# Patient Record
Sex: MALE | Race: WHITE | Employment: OTHER | URBAN - METROPOLITAN AREA
[De-identification: names, ages, dates, MRNs, and addresses within clinical notes are randomized per-mention and may not be internally consistent; named-entity substitution may affect disease eponyms.]

---

## 2017-05-10 RX ORDER — DEXTROMETHORPHAN/PSEUDOEPHED 2.5-7.5/.8
1.2 DROPS ORAL
Status: CANCELLED | OUTPATIENT
Start: 2017-05-10

## 2017-05-10 RX ORDER — ATROPINE SULFATE 0.1 MG/ML
0.5 INJECTION INTRAVENOUS
Status: CANCELLED | OUTPATIENT
Start: 2017-05-10 | End: 2017-05-10

## 2017-05-10 RX ORDER — EPINEPHRINE 0.1 MG/ML
1 INJECTION INTRACARDIAC; INTRAVENOUS
Status: CANCELLED | OUTPATIENT
Start: 2017-05-10 | End: 2017-05-10

## 2017-05-12 ENCOUNTER — HOSPITAL ENCOUNTER (OUTPATIENT)
Age: 67
Setting detail: OUTPATIENT SURGERY
Discharge: HOME | End: 2017-05-12
Attending: INTERNAL MEDICINE | Admitting: INTERNAL MEDICINE
Payer: COMMERCIAL

## 2017-05-12 VITALS
DIASTOLIC BLOOD PRESSURE: 68 MMHG | OXYGEN SATURATION: 98 % | TEMPERATURE: 97.6 F | BODY MASS INDEX: 44.1 KG/M2 | SYSTOLIC BLOOD PRESSURE: 140 MMHG | RESPIRATION RATE: 14 BRPM | HEIGHT: 71 IN | WEIGHT: 315 LBS | HEART RATE: 52 BPM

## 2017-05-12 LAB — GLUCOSE BLD STRIP.AUTO-MCNC: 155 MG/DL (ref 70–110)

## 2017-05-12 PROCEDURE — 77030013991 HC SNR POLYP ENDOSC BSC -A: Performed by: INTERNAL MEDICINE

## 2017-05-12 PROCEDURE — 99152 MOD SED SAME PHYS/QHP 5/>YRS: CPT

## 2017-05-12 PROCEDURE — 77030020256 HC SOL INJ NACL 0.9%  500ML: Performed by: INTERNAL MEDICINE

## 2017-05-12 PROCEDURE — 74011250636 HC RX REV CODE- 250/636

## 2017-05-12 PROCEDURE — 82962 GLUCOSE BLOOD TEST: CPT

## 2017-05-12 PROCEDURE — 99153 MOD SED SAME PHYS/QHP EA: CPT

## 2017-05-12 PROCEDURE — 74011250636 HC RX REV CODE- 250/636: Performed by: INTERNAL MEDICINE

## 2017-05-12 PROCEDURE — 88305 TISSUE EXAM BY PATHOLOGIST: CPT | Performed by: INTERNAL MEDICINE

## 2017-05-12 PROCEDURE — 76040000007: Performed by: INTERNAL MEDICINE

## 2017-05-12 RX ORDER — FENTANYL CITRATE 50 UG/ML
100 INJECTION, SOLUTION INTRAMUSCULAR; INTRAVENOUS
Status: ACTIVE | OUTPATIENT
Start: 2017-05-12 | End: 2017-05-12

## 2017-05-12 RX ORDER — ISOSORBIDE MONONITRATE 30 MG/1
30 TABLET, EXTENDED RELEASE ORAL DAILY
COMMUNITY

## 2017-05-12 RX ORDER — SODIUM CHLORIDE 9 MG/ML
100 INJECTION, SOLUTION INTRAVENOUS CONTINUOUS
Status: DISPENSED | OUTPATIENT
Start: 2017-05-12 | End: 2017-05-12

## 2017-05-12 RX ORDER — FLUMAZENIL 0.1 MG/ML
0.2 INJECTION INTRAVENOUS
Status: ACTIVE | OUTPATIENT
Start: 2017-05-12 | End: 2017-05-12

## 2017-05-12 RX ORDER — WARFARIN 2.5 MG/1
2.5 TABLET ORAL DAILY
COMMUNITY

## 2017-05-12 RX ORDER — MIDAZOLAM HYDROCHLORIDE 1 MG/ML
.5-5 INJECTION, SOLUTION INTRAMUSCULAR; INTRAVENOUS
Status: ACTIVE | OUTPATIENT
Start: 2017-05-12 | End: 2017-05-12

## 2017-05-12 RX ORDER — NALOXONE HYDROCHLORIDE 0.4 MG/ML
0.4 INJECTION, SOLUTION INTRAMUSCULAR; INTRAVENOUS; SUBCUTANEOUS
Status: ACTIVE | OUTPATIENT
Start: 2017-05-12 | End: 2017-05-12

## 2017-05-12 RX ADMIN — SODIUM CHLORIDE 100 ML/HR: 900 INJECTION, SOLUTION INTRAVENOUS at 08:03

## 2017-05-12 NOTE — PROCEDURES
[facility]  Colonoscopy Procedure Report  _______________________________________________________  Patient: Terry Carey                                         Attending Physician: Sonia Anderson MD    Patient ID: [id number]                                      Referring Physician: Puja Lopez MD    Exam Date: May 12, 2017 _______________________________________________________      Introduction: A  61 y.o. male patient, presents for outpatient Colonoscopy    Indications: Screen colon cancer. He had on already 2 colonoscopies by Dr Gumaro Stewart, the first colonoscopy 3 polyps and second  December 2, 2013 Six polyps were removed one was hyperplastic. He is asymptomatic. Consent: The benefits, risks, and alternatives to the procedure were discussed and informed consent was obtained from the patient. Preparation: EKG, pulse, pulse oximetry and blood pressure were monitored throughout the procedure. ASA Classification: Class 2 - . BMI 51 The heart is an S1-S2 and regular heart rate and rhythm. Lungs are clear to auscultation and percussion. Abdomen is soft, nondistended, and nontender. Mental Status: awake, alert, and oriented to person, place, and time    Medications:  · Fentanyl 100 mcg IV before procedure. · Versed 5 mg IV throughout the procedure. · Glucagon . 5 mg during    Rectal Exam: Normal Rectal Exam. No Blood. Prostate not enlarged. Pathology Specimens: 3 specimens removed. Procedure: The colonoscope was passed with ease through the anus under direct visualization and advanced to the cecum. The patient required positioning on the back to aid in the passage of the scope. The scope was withdrawn and the mucosa was carefully examined. The quality of the preparation was excellent. The views were excellent. The patient's toleration of the procedure was excellent. Retroflexion was preformed in the rectum, ascending colon and hepatic flexure.  The exam was done twice to the cecum. Total time is 27 minutes and withdrawal time is 21 minutes. Findings:    Rectum:   Medium sized internal hemorrhoids  Sigmoid:   tiny single diverticulum found in the sigmoid  Descending Colon:   5 mm small sessile polyp hot snared,   Transverse Colon:   5 mm small sessile polyp hot snared in the distal transverse colon   Ascending Colon:   6 mm small sessile polyp hot snared in the mid ascending colon,  Cecum:   Normal  Terminal Ileum:   Not seen    Unplanned Events: There were no unplanned events. Estimated Blood Loss: None  Impressions:    Medium sized internal hemorrhoids, 3 small sessile polyps were removed with hot snare, one 6 mm in the ascending colon, one 5 mm in the transverse colon and one 5 mm in the descending colon. Normal Mucosa. One tiny diverticulum found in the sigmoid. Complications: None; patient tolerated the procedure well. Recommendations:  · Discharge home when standard parameters are met. · Resume a high fiber diet. · Colonoscopy recommendation in 5 years.     Procedure Codes:    · Elysia Gonzales [[id number]]    Endoscope Information:  Model Number(s)    [id number]       Assistant: None      Signed By: Milka Davila MD Date: May 12, 2017

## 2017-05-12 NOTE — IP AVS SNAPSHOT
[address] [address] 
[phone] Patient: Lyndsay Reed MRN: [id number] ZPT:3/8/2703 You are allergic to the following Allergen Reactions Latex Contact Dermatitis Recent Documentation Height Weight BMI Smoking Status 1.803 m (!) 175.1 kg 53.84 kg/m2 Former Smoker Emergency Contacts Name Discharge Info Relation Home Work Chandler Redd  Other Relative [6] [phone] Jeanie Silvestre  Spouse [3] [phone] About your hospitalization You were admitted on:  May 12, 2017 You last received care in the:  [facility] ENDOSCOPY You were discharged on:  May 12, 2017 Unit phone number:  [phone] Why you were hospitalized Your primary diagnosis was:  Not on File Providers Seen During Your Hospitalizations Provider Role Specialty Primary office phone Tatiana Bianchi MD Attending Provider Gastroenterology [phone] Your Primary Care Physician (PCP) Primary Care Physician Office Phone Office Fax [address] [facility] [phone] Follow-up Information Follow up With Details Comments Contact Info [place]   [address] [address] [facility] 
[phone] Current Discharge Medication List  
  
CONTINUE these medications which have NOT CHANGED Dose & Instructions Dispensing Information Comments Morning Noon Evening Bedtime INSPRA 25 mg tablet Generic drug:  eplerenone Your last dose was: Your next dose is:    
   
   
 Dose:  25 mg Take 25 mg by mouth daily. Refills:  0  
     
   
   
   
  
 isosorbide mononitrate ER 30 mg tablet Commonly known as:  IMDUR Your last dose was: Your next dose is:    
   
   
 Dose:  30 mg Take 30 mg by mouth daily. Refills:  0  
     
   
   
   
  
 LANTUS 100 unit/mL injection Generic drug:  insulin glargine Your last dose was: Your next dose is:    
   
   
 Dose:  40 Units 40 Units by SubCUTAneous route nightly. Refills:  0  
     
   
   
   
  
 LASIX 40 mg tablet Generic drug:  furosemide Your last dose was: Your next dose is:    
   
   
 Dose:  40 mg Take 40 mg by mouth daily. Refills:  0  
     
   
   
   
  
 losartan 25 mg tablet Commonly known as:  COZAAR Your last dose was: Your next dose is:    
   
   
 Dose:  25 mg Take 25 mg by mouth daily. Refills:  0  
     
   
   
   
  
 magnesium oxide 400 mg tablet Commonly known as:  MAG-OX Your last dose was: Your next dose is:    
   
   
 Dose:  400 mg Take 400 mg by mouth two (2) times a day. With potassium Refills:  0 NovoLOG 100 unit/mL injection Generic drug:  insulin aspart Your last dose was: Your next dose is:    
   
   
 Dose:  15 Units 15 Units by SubCUTAneous route Before breakfast, lunch, and dinner. Patient took 5 units this morning for BS of 183 Refills:  0  
     
   
   
   
  
 potassium chloride 20 mEq tablet Commonly known as:  K-DUR, KLOR-CON Your last dose was: Your next dose is:    
   
   
 Dose:  60 mEq Take 60 mEq by mouth two (2) times a day. Refills:  0  
     
   
   
   
  
 SYNTHROID 200 mcg tablet Generic drug:  levothyroxine Your last dose was: Your next dose is:    
   
   
 Dose:  200 mcg Take 200 mcg by mouth Daily (before breakfast). Refills:  0  
     
   
   
   
  
 warfarin 2.5 mg tablet Commonly known as:  COUMADIN Your last dose was: Your next dose is:    
   
   
 Dose:  2.5 mg Take 2.5 mg by mouth daily. Refills:  0 Discharge Instructions Jan Silvestre [id number] 
1950 COLON DISCHARGE INSTRUCTIONS Discomfort: Redness at IV site- apply warm compress to area; if redness or soreness persist- contact your physician There may be a slight amount of blood passed from the rectum Gaseous discomfort- walking, belching will help relieve any discomfort You may not operate a vehicle til the next day. You may not engage in an occupation involving machinery or appliances til the next day. You may not drink alcoholic beverages til the next day. DIET: 
 High fiber diet. ACTIVITY: 
You may not  resume your normal daily activities til the next day. it is recommended that you spend the remainder of the day resting -  avoid any strenuous activity. CALL LALO Talley ANY SIGN OF: Increasing pain, nausea, vomiting Abdominal distension (swelling) New increased bleeding (oral or rectal) Fever (chills) Pain in chest area Bloody discharge from nose or mouth Shortness of breath You may not  take any Advil, Aspirin, Ibuprofen, Motrin, Aleve, or Goodys ONLY  Tylenol as needed for pain. Post procedure diagnosis:  HEMORRHOIDS, POLYPS Follow-up Instructions: Your follow up colonoscopy will be in 5 years. We will notify you the results of your biopsy by letter within 2 weeks. Parish Blackwood MD 
May 12, 2017 Discharge Orders None Introducing [facility]! Tanis Epley introduces RealSelf patient portal. Now you can access parts of your medical record, email your doctor's office, and request medication refills online. 1. In your internet browser, go to [email or web address] 2. Click on the First Time User? Click Here link in the Sign In box. You will see the New Member Sign Up page. 3. Enter your RealSelf Access Code exactly as it appears below. You will not need to use this code after youve completed the sign-up process. If you do not sign up before the expiration date, you must request a new code. · RealSelf Access Code: [email or web address] Expires: 7/25/2017 10:37 AM 
 
 4. Enter the last four digits of your Social Security Number (xxxx) and Date of Birth (mm/dd/yyyy) as indicated and click Submit. You will be taken to the next sign-up page. 5. Create a MedSocket ID. This will be your MedSocket login ID and cannot be changed, so think of one that is secure and easy to remember. 6. Create a MedSocket password. You can change your password at any time. 7. Enter your Password Reset Question and Answer. This can be used at a later time if you forget your password. 8. Enter your e-mail address. You will receive e-mail notification when new information is available in [address] 9. Click Sign Up. You can now view and download portions of your medical record. 10. Click the Download Summary menu link to download a portable copy of your medical information. If you have questions, please visit the Frequently Asked Questions section of the MedSocket website. Remember, MedSocket is NOT to be used for urgent needs. For medical emergencies, dial 911. Now available from your iPhone and Android! General Information Please provide this summary of care documentation to your next provider. Patient Signature:  ____________________________________________________________ Date:  ____________________________________________________________  
  
[place] Has Provider Signature:  ____________________________________________________________ Date:  ____________________________________________________________

## 2017-05-12 NOTE — PERIOP NOTES
Reviewed discahrge plan of care with patient and wife and they verbalized understanding.  ID band removed and placed in shredder at time of discharge

## 2017-05-12 NOTE — DISCHARGE INSTRUCTIONS
[facility]  [id number]  1950    COLON DISCHARGE INSTRUCTIONS    Discomfort:  Redness at IV site- apply warm compress to area; if redness or soreness persist- contact your physician  There may be a slight amount of blood passed from the rectum  Gaseous discomfort- walking, belching will help relieve any discomfort  You may not operate a vehicle til the next day. You may not engage in an occupation involving machinery or appliances til the next day. You may not drink alcoholic beverages til the next day. DIET:   High fiber diet. ACTIVITY:  You may not  resume your normal daily activities til the next day. it is recommended that you spend the remainder of the day resting -  avoid any strenuous activity. CALL M.D.  IF ANY SIGN OF:   Increasing pain, nausea, vomiting  Abdominal distension (swelling)  New increased bleeding (oral or rectal)  Fever (chills)  Pain in chest area  Bloody discharge from nose or mouth  Shortness of breath    You may not  take any Advil, Aspirin, Ibuprofen, Motrin, Aleve, or Goodys ONLY  Tylenol as needed for pain. Post procedure diagnosis:  HEMORRHOIDS, POLYPS     Follow-up Instructions: Your follow up colonoscopy will be in 5 years. We will notify you the results of your biopsy by letter within 2 weeks.     Kin Koenig MD  May 12, 2017

## (undated) DEVICE — SPONGE GZ W4XL4IN RAYON POLY 4 PLY NONWOVEN FASTER WICKING

## (undated) DEVICE — SYR 3ML LL TIP 1/10ML GRAD --

## (undated) DEVICE — KENDALL RADIOLUCENT FOAM MONITORING ELECTRODE RECTANGULAR SHAPE: Brand: KENDALL

## (undated) DEVICE — ENDO CARRY-ON PROCEDURE KIT INCLUDES ENZYMATIC SPONGE, GAUZE, BIOHAZARD LABEL, TRAY, LUBRICANT, DIRTY SCOPE LABEL, WATER LABEL, TRAY, DRAWSTRING PAD, AND DEFENDO 4-PIECE KIT.: Brand: ENDO CARRY-ON PROCEDURE KIT

## (undated) DEVICE — SET ADMIN 16ML TBNG L100IN 2 Y INJ SITE IV PIGGY BK DISP

## (undated) DEVICE — SOLUTION IV 500ML 0.9% SOD CHL FLX CONT

## (undated) DEVICE — TRAP SPEC COLL POLYP POLYSTYR --

## (undated) DEVICE — CATH SUC CTRL PRT TRIFLO 14FR --

## (undated) DEVICE — SYR 5ML 1/5 GRAD LL NSAF LF --

## (undated) DEVICE — SPONGE GZ W4XL4IN COT 12 PLY TYP VII WVN C FLD DSGN

## (undated) DEVICE — CATH IV SAFE STR 22GX1IN BLU -- PROTECTIV PLUS

## (undated) DEVICE — TRNQT TEXT 1X18IN BLU LF DISP -- CONVERT TO ITEM 362165

## (undated) DEVICE — NDL PRT INJ NSAF BLNT 18GX1.5 --

## (undated) DEVICE — MAJ-1414 SINGLE USE ADPATER BIOPSY VALV: Brand: SINGLE USE ADAPTOR BIOPSY VALVE

## (undated) DEVICE — NDL FLTR TIP 5 MIC 18GX1.5IN --

## (undated) DEVICE — ERBE NESSY®PLATE 170 SPLIT; 168CM²; CABLE 3M: Brand: ERBE

## (undated) DEVICE — WRISTBAND ID AD W2.5XL9.5CM RED VYN ADH CLSR UNI-PRINT

## (undated) DEVICE — SYRINGE 50ML E/T

## (undated) DEVICE — SNARE POLYP SM W13MMXL240CM SHTH DIA2.4MM OVL FLX DISP

## (undated) DEVICE — CANNULA CUSH AD W/ 14FT TBG

## (undated) DEVICE — SINGLE PORT MANIFOLD: Brand: NEPTUNE 2

## (undated) DEVICE — MEDI-VAC NON-CONDUCTIVE SUCTION TUBING: Brand: CARDINAL HEALTH